# Patient Record
Sex: MALE | Race: BLACK OR AFRICAN AMERICAN | NOT HISPANIC OR LATINO
[De-identification: names, ages, dates, MRNs, and addresses within clinical notes are randomized per-mention and may not be internally consistent; named-entity substitution may affect disease eponyms.]

---
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
## 2025-02-26 ENCOUNTER — NON-APPOINTMENT (OUTPATIENT)
Age: 20
End: 2025-02-26

## 2025-02-28 ENCOUNTER — NON-APPOINTMENT (OUTPATIENT)
Age: 20
End: 2025-02-28

## 2025-03-03 RX ORDER — AZITHROMYCIN 500 MG/1
500 TABLET, FILM COATED ORAL ONCE
Qty: 1 | Refills: 0 | Status: ACTIVE | COMMUNITY
Start: 2025-03-03 | End: 1900-01-01

## 2025-04-25 DIAGNOSIS — A74.9 CHLAMYDIAL INFECTION, UNSPECIFIED: ICD-10-CM
